# Patient Record
(demographics unavailable — no encounter records)

---

## 2025-03-17 NOTE — ADDENDUM
[FreeTextEntry1] : Blood will be drawn in office today.  This note was written by Kat Schaefer on 03/12/2025 acting as medical scribe for Dr. Ajay Bansal. I, Dr. Ajay Bansal, have read and attest that all the information, medical decision making and discharge instructions within are true and accurate.

## 2025-03-17 NOTE — HISTORY OF PRESENT ILLNESS
[FreeTextEntry1] : Ms. DREAD CHEUNG is a 49-year-old female who returns today in follow up with regard to a history of Hypothyroidism. On LT4 since childhood.   She is currently taking Levothyroxine 100 alt 112mcg QOD  Latest TFTs stable wnl in February 2025.   She has been compliant in taking the LT4 daily, away from food or any medication that may inhibit absorption. She has tolerated this medication well without any apparent adverse effects.  She denies any temperature intolerance, significant weight changes, or severe fatigue. She in addition denies any palpitations, tremors, anxiousness, change in bowel habits or significant change in moods.  She notes hair thinning.  Not sleeping well. Does note fatigue throughout the day. Sleeps 5-6 hours at night. Difficulty staying asleep.   She does note difficulty losing weight. Eating 1200 calories daily, high protein intake, and weightlifting, but feels weight loss is slow.   Ophthalmologic evaluation is NOT up to date. She last had her eyes checked when she renewed her license.  Thinks she is in perimenopause Last menses " a while ago" but still has not been full year yet. She believes her last menses was right after our previous visit in October 2024. Does get premenstrual sx at times but no menses.   Additional medical history otherwise neg  Did have HGH tx as a child. Is taking sublingual b12 daily and MV.  No extra D